# Patient Record
(demographics unavailable — no encounter records)

---

## 2024-11-25 NOTE — ASSESSMENT
[FreeTextEntry1] : Mr. 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 In 2021, found to have mediastinal and hilar adenopathy on CT imaging.   s/p EBUS guided FNA of Level 4R, 7, 10R, and RLL; BAL w/ Dr. Juan A Trevizo on 7/5/23. Path: 4R LN, Level 7 LN, 10R LN, and RLL: negative for malignant cells. BAL Cultures: Moderate strep pneumoniae; rare penicillin species. Gram stain: Few positive cocci in pairs and chains.  s/p Flexible bronchoscopy, cervical mediastinoscopy, mediastinal lymph node dissection on 7/31/23. All LNs negative for malignancy. Level 8A, 7B,7F, 7H, 7I, R4F, R4H: + lymph node with focal fibrotic healed granulomas consistent with mixed dust exposure.  s/p Flexible bronchoscopy, uniportal right video-assisted thoracic surgery, superior segmentectomy right lower lobe, hilar lymph node dissection, and intercostal nerve block on 8/3/23. 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 pT1c pN0. Additional Findings: Atypical adenomatous hyperplasia; Emphysema  Presents today with follow up imaging.   I have independently reviewed the medical records and imaging at the time of this office consultation, and discussed the following interpretations with the patient: -   Recommendations reviewed with patient during this office visit, and all questions answered; Patient instructed on the importance of follow up and verbalizes understanding.

## 2024-11-25 NOTE — HISTORY OF PRESENT ILLNESS
[FreeTextEntry1] : Mr. 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 In 2021, found to have mediastinal and hilar adenopathy on CT imaging. Was recommended to undergo a biopsy, but was unable to hold anticoagulation due to stent placement. s/p course of Prednisone  s/p EBUS guided FNA of Level 4R, 7, 10R, and RLL; BAL w/ Dr. Juan A Trevizo on 7/5/23. Path: 4R LN, Level 7 LN, 10R LN, and RLL: negative for malignant cells. BAL Cultures: Moderate strep pneumoniae; rare penicillin species. Gram stain: Few positive cocci in pairs and chains.  s/p Flexible bronchoscopy, cervical mediastinoscopy, mediastinal lymph node dissection on 7/31/23. All LNs negative for malignancy. Level 8A, 7B,7F, 7H, 7I, R4F, R4H: + lymph node with focal fibrotic healed granulomas consistent with mixed dust exposure.  s/p Flexible bronchoscopy, uniportal right video-assisted thoracic surgery, superior segmentectomy right lower lobe, hilar lymph node dissection, and intercostal nerve block on 8/3/23. 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 pT1c pN0. Additional Findings: Atypical adenomatous hyperplasia; Emphysema  CT Chest on 11/3/2023: - Emphysema; Right lower lobe wedge resection - Right pleural effusion. - The aorta is ectatic. The ascending aorta measures 3.9 cm at the main pulmonary artery Aortic calcifications.  CT Chest on 12/26/23:  - Post op lung changes - Trace pericardial fluid is unchanged - Vascular calcifications with involvement of the aorta and the coronary arteries. - Small right pleural effusion with areas of loculation are unchanged. - Emphysema - RLL nodular opacity measuring about 2 cm (Series 3, image 85) New since November 3, 2023. - Old healed sternal fracture. - Focal infiltrative changes within the right lateral chest wall subcutaneous tissues with overall interval improvement sequela of prior surgery.  Last seen in November w/ plan to return to clinic in 3 months with CXR.  PULM: Dr. Babin  CARD: Dr. Peng - [facility] ([place])  Referred back for pleural effusion which was demonstrated on then recent CT. 12/29 evaluated. Antibiotics prescribed RTC in 2 months with CT  CT Chest on 5/4/24:  - Emphysema. s/p RLL superior segmentectomy with stable postsurgical changes. - Stable borderline AP window lymph nodes. - Also fracture deformity of the sternum and right 1st rib. - The right pleural effusion is resolved  CXR on 8/20/24: Stable, post op changes.   CT Chest on 11/26/24:   Patient presents today for follow up.

## 2024-12-03 NOTE — HISTORY OF PRESENT ILLNESS
[FreeTextEntry1] : Mr. 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 In 2021, found to have mediastinal and hilar adenopathy on CT imaging. Was recommended to undergo a biopsy, but was unable to hold anticoagulation due to stent placement. s/p course of Prednisone  s/p EBUS guided FNA of Level 4R, 7, 10R, and RLL; BAL w/ Dr. Juan A Trevizo on 7/5/23. Path: 4R LN, Level 7 LN, 10R LN, and RLL: negative for malignant cells. BAL Cultures: Moderate strep pneumoniae; rare penicillin species. Gram stain: Few positive cocci in pairs and chains.  s/p Flexible bronchoscopy, cervical mediastinoscopy, mediastinal lymph node dissection on 7/31/23. All LNs negative for malignancy. Level 8A, 7B,7F, 7H, 7I, R4F, R4H: + lymph node with focal fibrotic healed granulomas consistent with mixed dust exposure.  s/p Flexible bronchoscopy, uniportal right video-assisted thoracic surgery, superior segmentectomy right lower lobe, hilar lymph node dissection, and intercostal nerve block on 8/3/23. 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 pT1c pN0. Additional Findings: Atypical adenomatous hyperplasia; Emphysema  CT Chest on 11/3/2023: - Emphysema; Right lower lobe wedge resection - Right pleural effusion. - The aorta is ectatic. The ascending aorta measures 3.9 cm at the main pulmonary artery Aortic calcifications.  CT Chest on 12/26/23:  - Post op lung changes - Trace pericardial fluid is unchanged - Vascular calcifications with involvement of the aorta and the coronary arteries. - Small right pleural effusion with areas of loculation are unchanged. - Emphysema - RLL nodular opacity measuring about 2 cm (Series 3, image 85) New since November 3, 2023. - Old healed sternal fracture. - Focal infiltrative changes within the right lateral chest wall subcutaneous tissues with overall interval improvement sequela of prior surgery.  Last seen in November w/ plan to return to clinic in 3 months with CXR.  PULM: Dr. Babin  CARD: Dr. Peng - [facility] ([place])  Referred back for pleural effusion which was demonstrated on then recent CT. 12/29 evaluated. Antibiotics prescribed RTC in 2 months with CT  CT Chest on 5/4/24:  - Emphysema. s/p RLL superior segmentectomy with stable postsurgical changes. - Stable borderline AP window lymph nodes. - Also fracture deformity of the sternum and right 1st rib. - The right pleural effusion is resolved  CXR on 8/20/24: Stable, post op changes.   CT Chest on 11/26/24:  - Moderate COPD. - Scarring in the right lower lobe and sutures.  - Soft tissue adjacent to the sutures is stable and was also present on 11/3/2023.  Patient presents today for follow up. Overall, he reports to be feeling well. Denies any chest pain, shortness of breath, cough, or hemoptysis.  no

## 2024-12-03 NOTE — HISTORY OF PRESENT ILLNESS
[FreeTextEntry1] : Mr. 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 In 2021, found to have mediastinal and hilar adenopathy on CT imaging. Was recommended to undergo a biopsy, but was unable to hold anticoagulation due to stent placement. s/p course of Prednisone  s/p EBUS guided FNA of Level 4R, 7, 10R, and RLL; BAL w/ Dr. Juan A Trevizo on 7/5/23. Path: 4R LN, Level 7 LN, 10R LN, and RLL: negative for malignant cells. BAL Cultures: Moderate strep pneumoniae; rare penicillin species. Gram stain: Few positive cocci in pairs and chains.  s/p Flexible bronchoscopy, cervical mediastinoscopy, mediastinal lymph node dissection on 7/31/23. All LNs negative for malignancy. Level 8A, 7B,7F, 7H, 7I, R4F, R4H: + lymph node with focal fibrotic healed granulomas consistent with mixed dust exposure.  s/p Flexible bronchoscopy, uniportal right video-assisted thoracic surgery, superior segmentectomy right lower lobe, hilar lymph node dissection, and intercostal nerve block on 8/3/23. 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 pT1c pN0. Additional Findings: Atypical adenomatous hyperplasia; Emphysema  CT Chest on 11/3/2023: - Emphysema; Right lower lobe wedge resection - Right pleural effusion. - The aorta is ectatic. The ascending aorta measures 3.9 cm at the main pulmonary artery Aortic calcifications.  CT Chest on 12/26/23:  - Post op lung changes - Trace pericardial fluid is unchanged - Vascular calcifications with involvement of the aorta and the coronary arteries. - Small right pleural effusion with areas of loculation are unchanged. - Emphysema - RLL nodular opacity measuring about 2 cm (Series 3, image 85) New since November 3, 2023. - Old healed sternal fracture. - Focal infiltrative changes within the right lateral chest wall subcutaneous tissues with overall interval improvement sequela of prior surgery.  Last seen in November w/ plan to return to clinic in 3 months with CXR.  PULM: Dr. Babin  CARD: Dr. Peng - [facility] ([place])  Referred back for pleural effusion which was demonstrated on then recent CT. 12/29 evaluated. Antibiotics prescribed RTC in 2 months with CT  CT Chest on 5/4/24:  - Emphysema. s/p RLL superior segmentectomy with stable postsurgical changes. - Stable borderline AP window lymph nodes. - Also fracture deformity of the sternum and right 1st rib. - The right pleural effusion is resolved  CXR on 8/20/24: Stable, post op changes.   CT Chest on 11/26/24:  - Moderate COPD. - Scarring in the right lower lobe and sutures.  - Soft tissue adjacent to the sutures is stable and was also present on 11/3/2023.  Patient presents today for follow up. Overall, he reports to be feeling well. Denies any chest pain, shortness of breath, cough, or hemoptysis.

## 2024-12-03 NOTE — HISTORY OF PRESENT ILLNESS
[FreeTextEntry1] : Mr. 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 In 2021, found to have mediastinal and hilar adenopathy on CT imaging. Was recommended to undergo a biopsy, but was unable to hold anticoagulation due to stent placement. s/p course of Prednisone  s/p EBUS guided FNA of Level 4R, 7, 10R, and RLL; BAL w/ Dr. Juan A Trevizo on 7/5/23. Path: 4R LN, Level 7 LN, 10R LN, and RLL: negative for malignant cells. BAL Cultures: Moderate strep pneumoniae; rare penicillin species. Gram stain: Few positive cocci in pairs and chains.  s/p Flexible bronchoscopy, cervical mediastinoscopy, mediastinal lymph node dissection on 7/31/23. All LNs negative for malignancy. Level 8A, 7B,7F, 7H, 7I, R4F, R4H: + lymph node with focal fibrotic healed granulomas consistent with mixed dust exposure.  s/p Flexible bronchoscopy, uniportal right video-assisted thoracic surgery, superior segmentectomy right lower lobe, hilar lymph node dissection, and intercostal nerve block on 8/3/23. 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 pT1c pN0. Additional Findings: Atypical adenomatous hyperplasia; Emphysema  CT Chest on 11/3/2023: - Emphysema; Right lower lobe wedge resection - Right pleural effusion. - The aorta is ectatic. The ascending aorta measures 3.9 cm at the main pulmonary artery Aortic calcifications.  CT Chest on 12/26/23:  - Post op lung changes - Trace pericardial fluid is unchanged - Vascular calcifications with involvement of the aorta and the coronary arteries. - Small right pleural effusion with areas of loculation are unchanged. - Emphysema - RLL nodular opacity measuring about 2 cm (Series 3, image 85) New since November 3, 2023. - Old healed sternal fracture. - Focal infiltrative changes within the right lateral chest wall subcutaneous tissues with overall interval improvement sequela of prior surgery.  Last seen in November w/ plan to return to clinic in 3 months with CXR.  PULM: Dr. Babin  CARD: Dr. Peng - [facility] ([place])  Referred back for pleural effusion which was demonstrated on then recent CT. 12/29 evaluated. Antibiotics prescribed RTC in 2 months with CT  CT Chest on 5/4/24:  - Emphysema. s/p RLL superior segmentectomy with stable postsurgical changes. - Stable borderline AP window lymph nodes. - Also fracture deformity of the sternum and right 1st rib. - The right pleural effusion is resolved  CXR on 8/20/24: Stable, post op changes.   CT Chest on 11/26/24:  - Moderate COPD. - Scarring in the right lower lobe and sutures.  - Soft tissue adjacent to the sutures is stable and was also present on 11/3/2023.  Patient presents today for follow up. Overall, he reports to be feeling well. Denies any chest pain, shortness of breath, cough, or hemoptysis.

## 2024-12-03 NOTE — ASSESSMENT
[FreeTextEntry1] : Mr. 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 In 2021, found to have mediastinal and hilar adenopathy on CT imaging.   s/p EBUS guided FNA of Level 4R, 7, 10R, and RLL; BAL w/ Dr. Juan A Trevizo on 7/5/23. Path: 4R LN, Level 7 LN, 10R LN, and RLL: negative for malignant cells. BAL Cultures: Moderate strep pneumoniae; rare penicillin species. Gram stain: Few positive cocci in pairs and chains.  s/p Flexible bronchoscopy, cervical mediastinoscopy, mediastinal lymph node dissection on 7/31/23. All LNs negative for malignancy. Level 8A, 7B,7F, 7H, 7I, R4F, R4H: + lymph node with focal fibrotic healed granulomas consistent with mixed dust exposure.  s/p Flexible bronchoscopy, uniportal right video-assisted thoracic surgery, superior segmentectomy right lower lobe, hilar lymph node dissection, and intercostal nerve block on 8/3/23. 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 pT1c pN0. Additional Findings: Atypical adenomatous hyperplasia; Emphysema  Presents today with follow up imaging.   I have independently reviewed the medical records and imaging at the time of this office consultation, and discussed the following interpretations with the patient: - CT Chest reviewed with patient, stable post-op changes. No evidence of recurrence. I discussed he returns to clinic in months with CXR. - Additionally, recent bloodwork with positive IgG Kappa, will refer to Dr. Yaritza Arita (heme/onc).   Recommendations reviewed with patient during this office visit, and all questions answered; Patient instructed on the importance of follow up and verbalizes understanding.   I, ISAAC Garcia, personally performed the evaluation and management (E/M) services for this established patient. That E/M includes conducting the examination, assessing all new/exacerbated conditions, and establishing a new plan of care. Today, my ACP, Kendall Olson NP, was here to observe my evaluation and management services for this new problem/exacerbated condition to be followed going forward.

## 2025-03-11 NOTE — ASSESSMENT
[FreeTextEntry1] : Mr. 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 In 2021, found to have mediastinal and hilar adenopathy on CT imaging.   s/p EBUS guided FNA of Level 4R, 7, 10R, and RLL; BAL w/ Dr. Juan A Trevizo on 7/5/23. Path: 4R LN, Level 7 LN, 10R LN, and RLL: negative for malignant cells. BAL Cultures: Moderate strep pneumoniae; rare penicillin species. Gram stain: Few positive cocci in pairs and chains.  s/p Flexible bronchoscopy, cervical mediastinoscopy, mediastinal lymph node dissection on 7/31/23. All LNs negative for malignancy. Level 8A, 7B,7F, 7H, 7I, R4F, R4H: + lymph node with focal fibrotic healed granulomas consistent with mixed dust exposure.  s/p Flexible bronchoscopy, uniportal right video-assisted thoracic surgery, superior segmentectomy right lower lobe, hilar lymph node dissection, and intercostal nerve block on 8/3/23. 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 pT1c pN0. Additional Findings: Atypical adenomatous hyperplasia; Emphysema  Presents today with follow up imaging.   I have independently reviewed the medical records and imaging at the time of this office consultation, and discussed the following interpretations with the patient: - CXR reviewed with patient, no evidence of recurrence. I discussed he returns to clinic in 3 months with CT Chest without contrast, he is agreeable.   Recommendations reviewed with patient during this office visit, and all questions answered; Patient instructed on the importance of follow up and verbalizes understanding.   I, HALEY GarciaIM, personally performed the evaluation and management (E/M) services for this established patient. That E/M includes conducting the examination, assessing all new/exacerbated conditions, and establishing a new plan of care. Today, my ACP, Kendall Olson NP, was here to observe my evaluation and management services for this new problem/exacerbated condition to be followed going forward.

## 2025-03-11 NOTE — HISTORY OF PRESENT ILLNESS
[FreeTextEntry1] : Mr. 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 In 2021, found to have mediastinal and hilar adenopathy on CT imaging. Was recommended to undergo a biopsy, but was unable to hold anticoagulation due to stent placement. s/p course of Prednisone  s/p EBUS guided FNA of Level 4R, 7, 10R, and RLL; BAL w/ Dr. Juan A Trevizo on 7/5/23. Path: 4R LN, Level 7 LN, 10R LN, and RLL: negative for malignant cells. BAL Cultures: Moderate strep pneumoniae; rare penicillin species. Gram stain: Few positive cocci in pairs and chains.  s/p Flexible bronchoscopy, cervical mediastinoscopy, mediastinal lymph node dissection on 7/31/23. All LNs negative for malignancy. Level 8A, 7B,7F, 7H, 7I, R4F, R4H: + lymph node with focal fibrotic healed granulomas consistent with mixed dust exposure.  s/p Flexible bronchoscopy, uniportal right video-assisted thoracic surgery, superior segmentectomy right lower lobe, hilar lymph node dissection, and intercostal nerve block on 8/3/23. 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 pT1c pN0. Additional Findings: Atypical adenomatous hyperplasia; Emphysema  CT Chest on 11/3/2023: - Emphysema; Right lower lobe wedge resection - Right pleural effusion. - The aorta is ectatic. The ascending aorta measures 3.9 cm at the main pulmonary artery Aortic calcifications.  CT Chest on 12/26/23:  - Post op lung changes - Trace pericardial fluid is unchanged - Vascular calcifications with involvement of the aorta and the coronary arteries. - Small right pleural effusion with areas of loculation are unchanged. - Emphysema - RLL nodular opacity measuring about 2 cm (Series 3, image 85) New since November 3, 2023. - Old healed sternal fracture. - Focal infiltrative changes within the right lateral chest wall subcutaneous tissues with overall interval improvement sequela of prior surgery.  Last seen in November w/ plan to return to clinic in 3 months with CXR.  PULM: Dr. Babin  CARD: Dr. Peng - [facility] ([place])  Referred back for pleural effusion which was demonstrated on then recent CT. 12/29 evaluated. Antibiotics prescribed RTC in 2 months with CT  CT Chest on 5/4/24:  - Emphysema. s/p RLL superior segmentectomy with stable postsurgical changes. - Stable borderline AP window lymph nodes. - Also fracture deformity of the sternum and right 1st rib. - The right pleural effusion is resolved  CXR on 8/20/24: Stable, post op changes.   CT Chest on 11/26/24:  - Moderate COPD. - Scarring in the right lower lobe and sutures.  - Soft tissue adjacent to the sutures is stable and was also present on 11/3/2023.  Seen on 12/3/25: recent bloodwork with positive IgG Kappa, will refer to Dr. Yaritza Arita (heme/onc).  RTC in 3 months with CXR.   CXR on 3/3/2025:  - No acute findings, severe centrilobular emphysema and mild scarring in the right lower lobe, grossly unchanged  Patient presents today for follow up. Overall, he reports to be feeling well. Denies any chest pain, shortness of breath, cough, or hemoptysis.

## 2025-03-11 NOTE — HISTORY OF PRESENT ILLNESS
[FreeTextEntry1] : Mr. 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 In 2021, found to have mediastinal and hilar adenopathy on CT imaging. Was recommended to undergo a biopsy, but was unable to hold anticoagulation due to stent placement. s/p course of Prednisone  s/p EBUS guided FNA of Level 4R, 7, 10R, and RLL; BAL w/ Dr. Juan A Trevizo on 7/5/23. Path: 4R LN, Level 7 LN, 10R LN, and RLL: negative for malignant cells. BAL Cultures: Moderate strep pneumoniae; rare penicillin species. Gram stain: Few positive cocci in pairs and chains.  s/p Flexible bronchoscopy, cervical mediastinoscopy, mediastinal lymph node dissection on 7/31/23. All LNs negative for malignancy. Level 8A, 7B,7F, 7H, 7I, R4F, R4H: + lymph node with focal fibrotic healed granulomas consistent with mixed dust exposure.  s/p Flexible bronchoscopy, uniportal right video-assisted thoracic surgery, superior segmentectomy right lower lobe, hilar lymph node dissection, and intercostal nerve block on 8/3/23. 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 pT1c pN0. Additional Findings: Atypical adenomatous hyperplasia; Emphysema  CT Chest on 11/3/2023: - Emphysema; Right lower lobe wedge resection - Right pleural effusion. - The aorta is ectatic. The ascending aorta measures 3.9 cm at the main pulmonary artery Aortic calcifications.  CT Chest on 12/26/23:  - Post op lung changes - Trace pericardial fluid is unchanged - Vascular calcifications with involvement of the aorta and the coronary arteries. - Small right pleural effusion with areas of loculation are unchanged. - Emphysema - RLL nodular opacity measuring about 2 cm (Series 3, image 85) New since November 3, 2023. - Old healed sternal fracture. - Focal infiltrative changes within the right lateral chest wall subcutaneous tissues with overall interval improvement sequela of prior surgery.  Last seen in November w/ plan to return to clinic in 3 months with CXR.  PULM: Dr. Babin  CARD: Dr. Peng - [facility] ([place])  Referred back for pleural effusion which was demonstrated on then recent CT. 12/29 evaluated. Antibiotics prescribed RTC in 2 months with CT  CT Chest on 5/4/24:  - Emphysema. s/p RLL superior segmentectomy with stable postsurgical changes. - Stable borderline AP window lymph nodes. - Also fracture deformity of the sternum and right 1st rib. - The right pleural effusion is resolved  CXR on 8/20/24: Stable, post op changes.   CT Chest on 11/26/24:  - Moderate COPD. - Scarring in the right lower lobe and sutures.  - Soft tissue adjacent to the sutures is stable and was also present on 11/3/2023.  Seen on 12/3/25: recent bloodwork with positive IgG Kappa, will refer to Dr. Yaritza Arita (heme/onc).  RTC in 3 months with CXR.   CXR on 3/3/2025:  - No acute findings, severe centrilobular emphysema and mild scarring in the right lower lobe, grossly unchanged  Patient presents today for follow up. Overall, he reports to be feeling well. Denies any chest pain, shortness of breath, cough, or hemoptysis.

## 2025-06-10 NOTE — HISTORY OF PRESENT ILLNESS
[FreeTextEntry1] : Mr. 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 In 2021, found to have mediastinal and hilar adenopathy on CT imaging. Was recommended to undergo a biopsy, but was unable to hold anticoagulation due to stent placement. s/p course of Prednisone  s/p EBUS guided FNA of Level 4R, 7, 10R, and RLL; BAL w/ Dr. Juan A Trevizo on 7/5/23. Path: 4R LN, Level 7 LN, 10R LN, and RLL: negative for malignant cells. BAL Cultures: Moderate strep pneumoniae; rare penicillin species. Gram stain: Few positive cocci in pairs and chains.  s/p Flexible bronchoscopy, cervical mediastinoscopy, mediastinal lymph node dissection on 7/31/23. All LNs negative for malignancy. Level 8A, 7B,7F, 7H, 7I, R4F, R4H: + lymph node with focal fibrotic healed granulomas consistent with mixed dust exposure.  s/p Flexible bronchoscopy, uniportal right video-assisted thoracic surgery, superior segmentectomy right lower lobe, hilar lymph node dissection, and intercostal nerve block on 8/3/23. 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 pT1c pN0. Additional Findings: Atypical adenomatous hyperplasia; Emphysema  CT Chest on 11/3/2023: - Emphysema; Right lower lobe wedge resection - Right pleural effusion. - The aorta is ectatic. The ascending aorta measures 3.9 cm at the main pulmonary artery Aortic calcifications.  CT Chest on 12/26/23:  - Post op lung changes - Trace pericardial fluid is unchanged - Vascular calcifications with involvement of the aorta and the coronary arteries. - Small right pleural effusion with areas of loculation are unchanged. - Emphysema - RLL nodular opacity measuring about 2 cm (Series 3, image 85) New since November 3, 2023. - Old healed sternal fracture. - Focal infiltrative changes within the right lateral chest wall subcutaneous tissues with overall interval improvement sequela of prior surgery.  Last seen in November w/ plan to return to clinic in 3 months with CXR.  PULM: Dr. Babin  CARD: Dr. Peng - [facility] ([place])  Referred back for pleural effusion which was demonstrated on then recent CT. 12/29 evaluated. Antibiotics prescribed RTC in 2 months with CT  CT Chest on 5/4/24:  - Emphysema. s/p RLL superior segmentectomy with stable postsurgical changes. - Stable borderline AP window lymph nodes. - Also fracture deformity of the sternum and right 1st rib. - The right pleural effusion is resolved  CXR on 8/20/24: Stable, post op changes.   CT Chest on 11/26/24:  - Moderate COPD. - Scarring in the right lower lobe and sutures.  - Soft tissue adjacent to the sutures is stable and was also present on 11/3/2023.  Seen on 12/3/25: recent bloodwork with positive IgG Kappa, will refer to Dr. Yaritza Arita (heme/onc).  RTC in 3 months with CXR.   CXR on 3/3/2025:  - No acute findings, severe centrilobular emphysema and mild scarring in the right lower lobe, grossly unchanged  CT Chest on 6/11/2025:  Patient presents today for follow up.

## 2025-06-10 NOTE — HISTORY OF PRESENT ILLNESS
[FreeTextEntry1] : Mr. 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 In 2021, found to have mediastinal and hilar adenopathy on CT imaging. Was recommended to undergo a biopsy, but was unable to hold anticoagulation due to stent placement. s/p course of Prednisone  s/p EBUS guided FNA of Level 4R, 7, 10R, and RLL; BAL w/ Dr. Juan A Trevizo on 7/5/23. Path: 4R LN, Level 7 LN, 10R LN, and RLL: negative for malignant cells. BAL Cultures: Moderate strep pneumoniae; rare penicillin species. Gram stain: Few positive cocci in pairs and chains.  s/p Flexible bronchoscopy, cervical mediastinoscopy, mediastinal lymph node dissection on 7/31/23. All LNs negative for malignancy. Level 8A, 7B,7F, 7H, 7I, R4F, R4H: + lymph node with focal fibrotic healed granulomas consistent with mixed dust exposure.  s/p Flexible bronchoscopy, uniportal right video-assisted thoracic surgery, superior segmentectomy right lower lobe, hilar lymph node dissection, and intercostal nerve block on 8/3/23. 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 pT1c pN0. Additional Findings: Atypical adenomatous hyperplasia; Emphysema  CT Chest on 11/3/2023: - Emphysema; Right lower lobe wedge resection - Right pleural effusion. - The aorta is ectatic. The ascending aorta measures 3.9 cm at the main pulmonary artery Aortic calcifications.  CT Chest on 12/26/23:  - Post op lung changes - Trace pericardial fluid is unchanged - Vascular calcifications with involvement of the aorta and the coronary arteries. - Small right pleural effusion with areas of loculation are unchanged. - Emphysema - RLL nodular opacity measuring about 2 cm (Series 3, image 85) New since November 3, 2023. - Old healed sternal fracture. - Focal infiltrative changes within the right lateral chest wall subcutaneous tissues with overall interval improvement sequela of prior surgery.  Last seen in November w/ plan to return to clinic in 3 months with CXR.  PULM: Dr. Babin  CARD: Dr. Peng - [facility] ([place])  Referred back for pleural effusion which was demonstrated on then recent CT. 12/29 evaluated. Antibiotics prescribed RTC in 2 months with CT  CT Chest on 5/4/24:  - Emphysema. s/p RLL superior segmentectomy with stable postsurgical changes. - Stable borderline AP window lymph nodes. - Also fracture deformity of the sternum and right 1st rib. - The right pleural effusion is resolved  CXR on 8/20/24: Stable, post op changes.   CT Chest on 11/26/24:  - Moderate COPD. - Scarring in the right lower lobe and sutures.  - Soft tissue adjacent to the sutures is stable and was also present on 11/3/2023.  Seen on 12/3/25: recent bloodwork with positive IgG Kappa, will refer to Dr. Yaritza Arita (heme/onc).  RTC in 3 months with CXR.   CXR on 3/3/2025:  - No acute findings, severe centrilobular emphysema and mild scarring in the right lower lobe, grossly unchanged  CT Chest on 6/11/2025:  Patient presents today for follow up.

## 2025-06-20 NOTE — ADDENDUM
[FreeTextEntry1] : I, Courtney Gerber, acted solely as a scribe for Dr. Leonel Jarvis M.D. on this date 06/20/2025.   All medical record entries made by the Scribe were at my, Dr. Leonel Jarvis M.D., direction and personally dictated by me on 06/20/2025. I have reviewed the chart and agree that the record accurately reflects my personal performance of the history, physical exam, assessment and plan. I have also personally directed, reviewed, and agreed with the chart.

## 2025-06-20 NOTE — HISTORY OF PRESENT ILLNESS
[Current] : current [>= 20 pack years] : >= 20 pack years [Never] : never [TextBox_4] : 63-year-old male here for initial visit regarding abnormal CT scan.  He had previous lung surgery and has family medical history of lung cancer.  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  He has no pain he reports no recent fever chills or sweats.

## 2025-06-20 NOTE — ASSESSMENT
[FreeTextEntry1] : Chest CT demonstrates new right pleural thickening.  Etiology unclear.  Too small to aspirate.  Should get PET/CT to assess cancer status.  If no significant cancer related findings on PET/CT recommended interval follow-up chest CT in 6 to 8 weeks Assuming an otherwise negative PET/CT (excluding the pleura) we then need to explore the etiology of the pleural effusion.  He may have a recurrent pleural pericarditis-pericarditis is noted in the record although I cannot find correlating imaging-or could be the beginning of an asbestos pleural process.  Would recommend a short-term interval follow-up and then evaluation, thoracentesis if possible He has significant COPD.  His CT shows significant emphysematous changes as well.  I have encouraged him in the strongest possible terms to quit smoking given his history of lung cancer and COPD.  He should continue with Trelegy inhaler but due to his emphysema the benefit in terms of dyspnea may be limited.  Recorded time for visit excludes procedures done in office and includes review of outside reports and imaging if appropriate and indicated above

## 2025-06-20 NOTE — PROCEDURE
[FreeTextEntry1] : Chest CT demonstrates postoperative changes and new right pleural thickening/effusion

## 2025-07-30 NOTE — HISTORY OF PRESENT ILLNESS
[Current] : current [>= 20 pack years] : >= 20 pack years [Never] : never [TextBox_4] : Follow-up for COPD and pleural effusion.  Continues to smoke.  Taking Trelegy some improvement in breathing.  Here for follow-up No pain with breathing

## 2025-07-30 NOTE — ADDENDUM
[FreeTextEntry1] :  I, Angelina Stern, acted solely as a scribe for Dr. Leonel Jarvis M.D. on this date 07/30/2025   All medical record entries made by the Scribe were at my, Dr. Leonel Jarvis M.D., direction and personally dictated by me on 07/30/2025 I have reviewed the chart and agree that the record accurately reflects my personal performance of the history, physical exam, assessment and plan. I have also personally directed, reviewed, and agreed with the chart.

## 2025-07-30 NOTE — ASSESSMENT
[FreeTextEntry1] : Does not appear to be developing significant pleural fluid based on chest x-ray can hold off on follow-up chest CT Plan 3-month follow-up chest x-ray Continue Trelegy Continue attempts at smoking cessation